# Patient Record
Sex: FEMALE | Race: WHITE | NOT HISPANIC OR LATINO | Employment: FULL TIME | URBAN - METROPOLITAN AREA
[De-identification: names, ages, dates, MRNs, and addresses within clinical notes are randomized per-mention and may not be internally consistent; named-entity substitution may affect disease eponyms.]

---

## 2020-01-28 ENCOUNTER — HOSPITAL ENCOUNTER (EMERGENCY)
Facility: HOSPITAL | Age: 20
Discharge: HOME | End: 2020-01-29
Admitting: EMERGENCY MEDICINE

## 2020-01-28 ENCOUNTER — APPOINTMENT (OUTPATIENT)
Dept: CT IMAGING | Facility: HOSPITAL | Age: 20
End: 2020-01-28

## 2020-01-28 VITALS
BODY MASS INDEX: 26.89 KG/M2 | DIASTOLIC BLOOD PRESSURE: 76 MMHG | WEIGHT: 133.38 LBS | HEART RATE: 74 BPM | HEIGHT: 59 IN | RESPIRATION RATE: 15 BRPM | TEMPERATURE: 98 F | OXYGEN SATURATION: 100 % | SYSTOLIC BLOOD PRESSURE: 138 MMHG

## 2020-01-28 DIAGNOSIS — R51.9 NONINTRACTABLE HEADACHE, UNSPECIFIED CHRONICITY PATTERN, UNSPECIFIED HEADACHE TYPE: Primary | ICD-10-CM

## 2020-01-28 DIAGNOSIS — S00.83XA CONTUSION OF FACE, INITIAL ENCOUNTER: ICD-10-CM

## 2020-01-28 PROCEDURE — 99282 EMERGENCY DEPT VISIT SF MDM: CPT

## 2020-01-28 PROCEDURE — 70450 CT HEAD/BRAIN W/O DYE: CPT

## 2021-01-22 ENCOUNTER — TELEPHONE (OUTPATIENT)
Dept: URGENT CARE | Facility: CLINIC | Age: 21
End: 2021-01-22

## 2021-01-22 PROCEDURE — U0003 INFECTIOUS AGENT DETECTION BY NUCLEIC ACID (DNA OR RNA); SEVERE ACUTE RESPIRATORY SYNDROME CORONAVIRUS 2 (SARS-COV-2) (CORONAVIRUS DISEASE [COVID-19]), AMPLIFIED PROBE TECHNIQUE, MAKING USE OF HIGH THROUGHPUT TECHNOLOGIES AS DESCRIBED BY CMS-2020-01-R: HCPCS | Performed by: FAMILY MEDICINE

## 2021-10-11 ENCOUNTER — TREATMENT (OUTPATIENT)
Dept: PHYSICAL THERAPY | Facility: CLINIC | Age: 21
End: 2021-10-11

## 2021-10-11 DIAGNOSIS — M79.605 LEFT LEG PAIN: Primary | ICD-10-CM

## 2021-10-11 PROCEDURE — 97110 THERAPEUTIC EXERCISES: CPT | Performed by: PHYSICAL THERAPIST

## 2021-10-11 PROCEDURE — 97162 PT EVAL MOD COMPLEX 30 MIN: CPT | Performed by: PHYSICAL THERAPIST

## 2021-10-11 NOTE — PROGRESS NOTES
Physical Therapy Initial Evaluation and Plan of Care    Patient: Katelyn Ferro   : 2000  Diagnosis/ICD-10 Code:  Left leg pain [M79.605]  Referring practitioner: SONG Perez  Date of Initial Visit: 10/11/2021  Today's Date: 10/11/2021  Patient seen for 1 sessions           Subjective Questionnaire: LEFS: 30%      Subjective Evaluation    History of Present Illness  Mechanism of injury: Patient presents to physical therapy with cc of left leg pain.  Reports that a few months ago she was helping move furniture at her work and reports hip pain started shortly after that.  Patient has cerebral palsy.  Reports that her symptoms of CP have been generally mild most of her life.  Patient has AFO on left ankle.  Reports that she sometimes uses a cane to assist with her walking.     Patient works in a  and is on her feet for long hours at a time.  Wishes to have less pain in her left hip with ADL's.     Pain  Current pain ratin  Quality: sharp and burning  Relieving factors: ice, rest and medications  Aggravating factors: ambulation, standing and squatting  Progression: no change    Diagnostic Tests  X-ray: normal    Patient Goals  Patient goals for therapy: decreased pain and increased strength             Objective          Palpation   Left   Tenderness of the iliopsoas, piriformis, rectus femoris and TFL.     Passive Range of Motion   Left Hip   Flexion: 90 degrees with pain  External rotation (90/90): 45 degrees with pain  Internal rotation (90/90): WFL    Right Hip   Flexion: 125 degrees with pain  External rotation (90/90): WFL  Internal rotation (90/90): WFL    Strength/Myotome Testing     Left Hip   Planes of Motion   Flexion: 4+  Abduction: 4  Adduction: 4    Right Hip   Planes of Motion   Flexion: 4+  Abduction: 4-  Adduction: 4-    Left Knee   Flexion: 4+  Extension: 4+    Right Knee   Flexion: 3-  Extension: 4+    Ambulation     Comments   Right knee valgus  Decreased stance  time on RLE          Assessment & Plan     Assessment  Impairments: abnormal gait, impaired physical strength, lacks appropriate home exercise program and pain with function  Assessment details: Patient presents to physical therapy with s/s of left leg pain.  Noted TTP of right hip flexor muscles, as well as in lateral right proximal thigh.  Noted limited active and passive ROM in right hip, limited by pain.  Patient would benefit from PT intervention in order to address these deficits so that she may return to ADL's and work activities with less pain and limitation.   Prognosis: good  Functional Limitations: uncomfortable because of pain  Goals  Plan Goals: In two weeks, patient will reports at least 25% reduction in pain level in left hip.   In two weeks, patient demonstrate at least 4+/5 muscular strength in LLE.    In four weeks, patient will demonstrate at least 25% improvement in PROM in left hip.   In four weeks, patient will demonstrate decreased perceived disability by increasing score on LEFS by at least 12%.   In four weeks, patient will demonstrate compliance with HEP.     Plan  Therapy options: will be seen for skilled physical therapy services  Planned modality interventions: cryotherapy and thermotherapy (hydrocollator packs)  Planned therapy interventions: manual therapy, soft tissue mobilization, strengthening, stretching, therapeutic activities, home exercise program, gait training, functional ROM exercises and abdominal trunk stabilization  Duration in visits: 20  Plan details: 1-3 times per week        Manual Therapy:         mins  19710;  Therapeutic Exercise:    15     mins  55817;     Neuromuscular Selina:        mins  84114;    Therapeutic Activity:          mins  21692;     Gait Training:           mins  93224;     Ultrasound:          mins  52504;    Electrical Stimulation:         mins  12433 ( );  Dry Needling          mins self-pay    Timed Treatment:   15   mins   Total Treatment:      45   mins    PT SIGNATURE: Gaudencio Ch, PT   DATE TREATMENT INITIATED: 10/11/2021    Initial Certification  Certification Period: 1/9/2022  I certify that the therapy services are furnished while this patient is under my care.  The services outlined above are required by this patient, and will be reviewed every 90 days.     PHYSICIAN: Mary Jo Singh, APRN      DATE:     Please sign and return via fax to [phone].. Thank you, [facility].

## 2022-11-21 ENCOUNTER — TRANSCRIBE ORDERS (OUTPATIENT)
Dept: PHYSICAL THERAPY | Facility: CLINIC | Age: 22
End: 2022-11-21

## 2022-11-21 DIAGNOSIS — Z86.69 HX OF CEREBRAL PALSY: ICD-10-CM

## 2022-11-21 DIAGNOSIS — M62.838 MUSCLE SPASM: Primary | ICD-10-CM

## 2022-12-06 ENCOUNTER — TREATMENT (OUTPATIENT)
Dept: PHYSICAL THERAPY | Facility: CLINIC | Age: 22
End: 2022-12-06

## 2022-12-06 DIAGNOSIS — M79.604 RIGHT LEG PAIN: Primary | ICD-10-CM

## 2022-12-06 PROCEDURE — 97162 PT EVAL MOD COMPLEX 30 MIN: CPT | Performed by: PHYSICAL THERAPIST

## 2022-12-06 PROCEDURE — 97110 THERAPEUTIC EXERCISES: CPT | Performed by: PHYSICAL THERAPIST

## 2022-12-06 NOTE — PROGRESS NOTES
Physical Therapy Initial Evaluation and Plan of Care      Patient: Katelyn Ferro   : 2000  Diagnosis/ICD-10 Code:  Right leg pain [M79.604]  Referring practitioner: SONG Perez  Date of Initial Visit: 2022  Today's Date: 2022  Patient seen for 1 sessions           Subjective Questionnaire: LEFS: 35%      Subjective Evaluation    History of Present Illness  Mechanism of injury: Patient presents to physical therapy with cc of right hip and leg pain.  Reports weakness and tightness in right leg musculature that has been making getting up off the floor difficult.  Patient has history of cerebral palsy.  Patient wears AFO on right ankle full time.  Patient works as a  and is up on her feet most the day while at school.  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  Wishes to have less pain/limitation with mobility due to right leg pain/weakness.     Pain  Current pain ratin  Quality: dull ache and sharp  Relieving factors: medications, heat and ice  Aggravating factors: ambulation, squatting and standing  Progression: worsening    Treatments  Previous treatment: medication  Patient Goals  Patient goals for therapy: decreased pain and increased strength             Objective          Palpation     Right   Hypertonic in the iliopsoas. Tenderness of the iliopsoas, piriformis and rectus femoris.     Passive Range of Motion     Right Hip   Flexion: 90 degrees with pain  External rotation (90/90): 50 degrees with pain  Internal rotation (90/90): 10 degrees with pain    Strength/Myotome Testing     Left Hip   Planes of Motion   Flexion: 5  Extension: 4+  Abduction: 4+  External rotation: 5  Internal rotation: 5    Right Hip   Planes of Motion   Flexion: 5  Extension: 4  Abduction: 4  External rotation: 4  Internal rotation: 4    Left Knee   Flexion: 5  Extension: 5    Right Knee   Flexion: 2+  Extension: 4+    Left Ankle/Foot    Dorsiflexion: 4+          Assessment & Plan     Assessment  Impairments: abnormal or restricted ROM, impaired physical strength, lacks appropriate home exercise program and pain with function  Functional Limitations: uncomfortable because of pain  Assessment details: Patient presents to physical therapy with s/s of right hip and leg pain.  Noted limited PROM in right hip by pain.  Noted tenderness and hypertonicity of right hip flexors.  Noted weakness in bilateral hips as well.  Patient is appropriate for PT intervention in order to address these deficits so that she may complete work and ADL activities with less pain/limitation.   Prognosis: good    Goals  Plan Goals: In two weeks, patient will report at least 25% reduction in pain level.    In two weeks, patient will demonstrate at least 25% improvement in PROM in right hip.     In four weeks, patient will demonstrate right hip AROM WFL without pain level over 2/10.  In four weeks, patient will demonstrate decreased perceived disability by increasing score on LEFS by at least 12%.  In four weeks, patient will report standing from kneeling position without pain while at school for at least 3 consecutive days.       Plan  Therapy options: will be seen for skilled therapy services  Planned modality interventions: cryotherapy, dry needling and thermotherapy (hydrocollator packs)  Planned therapy interventions: manual therapy, neuromuscular re-education, soft tissue mobilization, strengthening, stretching, therapeutic activities, home exercise program, gait training and abdominal trunk stabilization  Frequency: 1x week  Duration in weeks: 6        Manual Therapy:         mins  59077;  Therapeutic Exercise:    15     mins  73071;     Neuromuscular Selina:        mins  58947;    Therapeutic Activity:          mins  75390;     Gait Training:           mins  16273;     Ultrasound:          mins  80166;    Electrical Stimulation:         mins  00526 ( );  Dry  Needling          mins self-pay    Timed Treatment:   15   mins   Total Treatment:     40   mins    PT SIGNATURE: Gaudencio Ch, PT   DATE TREATMENT INITIATED: 12/6/2022    Initial Certification  Certification Period: 3/6/2023  I certify that the therapy services are furnished while this patient is under my care.  The services outlined above are required by this patient, and will be reviewed every 90 days.     PHYSICIAN: Mary Jo Singh, APRN      DATE:     Please sign and return via fax to [phone].. Thank you, [facility].

## 2022-12-15 ENCOUNTER — TREATMENT (OUTPATIENT)
Dept: PHYSICAL THERAPY | Facility: CLINIC | Age: 22
End: 2022-12-15

## 2022-12-15 DIAGNOSIS — M79.604 RIGHT LEG PAIN: Primary | ICD-10-CM

## 2022-12-15 PROCEDURE — 97140 MANUAL THERAPY 1/> REGIONS: CPT | Performed by: PHYSICAL THERAPIST

## 2022-12-15 PROCEDURE — 97110 THERAPEUTIC EXERCISES: CPT | Performed by: PHYSICAL THERAPIST

## 2022-12-15 NOTE — PROGRESS NOTES
Physical Therapy Daily Progress Note      Patient: Katelyn Ferro   : 2000  Diagnosis/ICD-10 Code:  Right leg pain [M79.604]  Referring practitioner: SONG Perez  Date of Initial Visit: Type: THERAPY  Noted: 2022  Today's Date: 12/15/2022  Patient seen for 2 sessions         Katelyn Ferro reports:  Right hip feeling a little better, however still sore at times.  Reports compliance with HEP.    Objective   See Exercise, Manual, and Modality Logs for complete treatment.       Assessment/Plan     Tolerates exercise and manual therapy well this visit.  Noted tenderness in rectus femoris, which reported to decreased after manual therapy.  Feeling well at end of visit.    Progress per Plan of Care           Manual Therapy:    10     mins  35996;  Therapeutic Exercise:    30     mins  68471;     Neuromuscular Selina:        mins  57351;    Therapeutic Activity:          mins  17321;     Gait Training:           mins  40808;     Ultrasound:          mins  74931;    Electrical Stimulation:         mins  93719 ( );  Dry Needling          mins self-pay    Timed Treatment:   40   mins   Total Treatment:     40   mins    Gaudencio Ch PT  Physical Therapist

## 2022-12-19 ENCOUNTER — TREATMENT (OUTPATIENT)
Dept: PHYSICAL THERAPY | Facility: CLINIC | Age: 22
End: 2022-12-19

## 2022-12-19 DIAGNOSIS — M79.604 RIGHT LEG PAIN: Primary | ICD-10-CM

## 2022-12-19 PROCEDURE — 97110 THERAPEUTIC EXERCISES: CPT | Performed by: PHYSICAL THERAPIST

## 2022-12-19 PROCEDURE — 97140 MANUAL THERAPY 1/> REGIONS: CPT | Performed by: PHYSICAL THERAPIST

## 2022-12-19 NOTE — PROGRESS NOTES
Physical Therapy Daily Progress Note    Patient: Katelyn Ferro   : 2000  Diagnosis/ICD-10 Code:  Right leg pain [M79.604]  Referring practitioner: SONG Perez  Date of Initial Visit: Type: THERAPY  Noted: 2022  Today's Date: 2022  Patient seen for 3 sessions         Katelyn Ferro reports:  Right hip feeling better.  Patient reports compliance with HEP.    Objective   See Exercise, Manual, and Modality Logs for complete treatment.       Assessment/Plan     Noted limited hip ER on right side prior to treatment.  Tolerates manual therapy well.  Progressing well towards goals.     Progress per Plan of Care           Manual Therapy:    10     mins  92142;  Therapeutic Exercise:    30     mins  67412;     Neuromuscular Selina:        mins  52903;    Therapeutic Activity:          mins  60022;     Gait Training:           mins  79593;     Ultrasound:          mins  34728;    Electrical Stimulation:         mins  71119 ( );  Dry Needling          mins self-pay    Timed Treatment:   40   mins   Total Treatment:     40   mins    Gaudencio Ch PT  Physical Therapist

## 2022-12-27 ENCOUNTER — TREATMENT (OUTPATIENT)
Dept: PHYSICAL THERAPY | Facility: CLINIC | Age: 22
End: 2022-12-27

## 2022-12-27 DIAGNOSIS — M79.604 RIGHT LEG PAIN: Primary | ICD-10-CM

## 2022-12-27 PROCEDURE — 97110 THERAPEUTIC EXERCISES: CPT | Performed by: PHYSICAL THERAPIST

## 2022-12-27 PROCEDURE — 97140 MANUAL THERAPY 1/> REGIONS: CPT | Performed by: PHYSICAL THERAPIST

## 2022-12-27 NOTE — PROGRESS NOTES
Physical Therapy Daily Progress Note    Patient: Katelyn Ferro   : 2000  Diagnosis/ICD-10 Code:  Right leg pain [M79.604]  Referring practitioner: SONG Perez  Date of Initial Visit: Type: THERAPY  Noted: 2022  Today's Date: 2022  Patient seen for 4 sessions         Katelyn Ferro reports:  Right hip still has mild pain at times, however occurring less frequently.  Patient reports that she has been less active lately due to being off from work.    Objective   See Exercise, Manual, and Modality Logs for complete treatment.       Assessment/Plan     Tolerates today's treatment well.  Noted decreased TTP of right hip this visit.  Progressing well towards goals.    Progress per Plan of Care           Manual Therapy:    15     mins  78787;  Therapeutic Exercise:    15     mins  41406;     Neuromuscular Selina:        mins  30406;    Therapeutic Activity:          mins  84223;     Gait Training:           mins  63515;     Ultrasound:          mins  95893;    Electrical Stimulation:         mins  41729 ( );  Dry Needling          mins self-pay    Timed Treatment:   30   mins   Total Treatment:     30   mins    Gaudencio Ch PT  Physical Therapist

## 2023-01-03 ENCOUNTER — TREATMENT (OUTPATIENT)
Dept: PHYSICAL THERAPY | Facility: CLINIC | Age: 23
End: 2023-01-03
Payer: COMMERCIAL

## 2023-01-03 DIAGNOSIS — M79.604 RIGHT LEG PAIN: Primary | ICD-10-CM

## 2023-01-03 PROCEDURE — 97110 THERAPEUTIC EXERCISES: CPT | Performed by: PHYSICAL THERAPIST

## 2023-01-03 PROCEDURE — 97140 MANUAL THERAPY 1/> REGIONS: CPT | Performed by: PHYSICAL THERAPIST

## 2023-01-03 NOTE — PROGRESS NOTES
Physical Therapy Daily Progress Note    Patient: Katelyn Ferro   : 2000  Diagnosis/ICD-10 Code:  Right leg pain [M79.604]  Referring practitioner: SONG Perez  Date of Initial Visit: Type: THERAPY  Noted: 2022  Today's Date: 1/3/2023  Patient seen for 5 sessions         Katelyn Ferro reports:  Right hip feeling well.  Going back to work today.  Reports compliance with HEP.    Objective   See Exercise, Manual, and Modality Logs for complete treatment.       Assessment/Plan     Mild soreness in right anterior hip with contract/relax iliopsoas and rectus femoris stretching.  Feeling well at end of visit.    Progress per Plan of Care           Manual Therapy:    25     mins  68208;  Therapeutic Exercise:    15     mins  62646;     Neuromuscular Selina:        mins  32115;    Therapeutic Activity:          mins  66312;     Gait Training:           mins  53453;     Ultrasound:          mins  78151;    Electrical Stimulation:         mins  38898 ( );  Dry Needling          mins self-pay    Timed Treatment:   40   mins   Total Treatment:     40   mins    Gaudencio Ch PT  Physical Therapist

## 2023-01-10 ENCOUNTER — TREATMENT (OUTPATIENT)
Dept: PHYSICAL THERAPY | Facility: CLINIC | Age: 23
End: 2023-01-10
Payer: COMMERCIAL

## 2023-01-10 DIAGNOSIS — M79.604 RIGHT LEG PAIN: Primary | ICD-10-CM

## 2023-01-10 PROCEDURE — 97140 MANUAL THERAPY 1/> REGIONS: CPT | Performed by: PHYSICAL THERAPIST

## 2023-01-10 PROCEDURE — 97110 THERAPEUTIC EXERCISES: CPT | Performed by: PHYSICAL THERAPIST

## 2023-01-10 NOTE — PROGRESS NOTES
Physical Therapy Daily Progress Note    Patient: Katelyn Ferro   : 2000  Diagnosis/ICD-10 Code:  Right leg pain [M79.604]  Referring practitioner: SONG Perez  Date of Initial Visit: Type: THERAPY  Noted: 2022  Today's Date: 1/10/2023  Patient seen for 6 sessions         Katelyn Ferro reports:  Right hip was a little sore after returning to work last week.  Mild soreness in anterior hip today.     Objective   See Exercise, Manual, and Modality Logs for complete treatment.       Assessment/Plan     Tolerates treatment well this visit.  Requires manual cueing for proper technique with clamshell exercise.  Reviewed HEP with proper technique.     Progress per Plan of Care           Manual Therapy:    25     mins  84912;  Therapeutic Exercise:    18     mins  65938;     Neuromuscular Selina:        mins  92289;    Therapeutic Activity:          mins  66142;     Gait Training:           mins  91854;     Ultrasound:          mins  20123;    Electrical Stimulation:         mins  05885 ( );  Dry Needling          mins self-pay    Timed Treatment:   43   mins   Total Treatment:     43   mins    Gaudencio Ch PT  Physical Therapist

## 2023-01-20 ENCOUNTER — TREATMENT (OUTPATIENT)
Dept: PHYSICAL THERAPY | Facility: CLINIC | Age: 23
End: 2023-01-20
Payer: COMMERCIAL

## 2023-01-20 DIAGNOSIS — M79.604 RIGHT LEG PAIN: Primary | ICD-10-CM

## 2023-01-20 PROCEDURE — 97110 THERAPEUTIC EXERCISES: CPT | Performed by: PHYSICAL THERAPIST

## 2023-01-20 PROCEDURE — 97140 MANUAL THERAPY 1/> REGIONS: CPT | Performed by: PHYSICAL THERAPIST

## 2023-01-20 NOTE — PROGRESS NOTES
Physical Therapy Daily Progress Note      Patient: Katelyn Ferro   : 2000  Diagnosis/ICD-10 Code:  Right leg pain [M79.604]  Referring practitioner: SONG Perez  Date of Initial Visit: Type: THERAPY  Noted: 2022  Today's Date: 2023  Patient seen for 7 sessions             Subjective Pt reports right hip is sore today which she says is typical by the end of the work week.    Objective   See Exercise, Manual, and Modality Logs for complete treatment.       Assessment/Plan  Good tolerance with exercises.  Pt responds very well to manual stretching, stating she always feels better after PT session because she is not able to stretch her hip as well as therapy does.    Progress per Plan of Care           Timed:         Manual Therapy:   25     mins  50991;     Therapeutic Exercise:    18     mins  61515;         Timed Treatment:   43   mins   Total Treatment:     43   mins        Jesse Jaquez PTA  Physical Therapist Assistant

## 2023-01-24 ENCOUNTER — TREATMENT (OUTPATIENT)
Dept: PHYSICAL THERAPY | Facility: CLINIC | Age: 23
End: 2023-01-24
Payer: COMMERCIAL

## 2023-01-24 DIAGNOSIS — M79.604 RIGHT LEG PAIN: Primary | ICD-10-CM

## 2023-01-24 PROCEDURE — 97140 MANUAL THERAPY 1/> REGIONS: CPT | Performed by: PHYSICAL THERAPIST

## 2023-01-24 PROCEDURE — 97110 THERAPEUTIC EXERCISES: CPT | Performed by: PHYSICAL THERAPIST

## 2023-01-24 NOTE — PROGRESS NOTES
Physical Therapy Daily Progress Note    Patient: Katelyn Ferro   : 2000  Diagnosis/ICD-10 Code:  Right leg pain [M79.604]  Referring practitioner: SONG Perez  Date of Initial Visit: Type: THERAPY  Noted: 2022  Today's Date: 2023  Patient seen for 8 sessions         Katelyn Ferro reports:  Right hip is a little sore after work activities, however feeling better since starting PT.     Objective   See Exercise, Manual, and Modality Logs for complete treatment.       Assessment/Plan     Noted tenderness to palpation of proximal rectus femoris.  Tolerates treatment well.  Requires 1-2 verbal cues for proper exercise technique.    Progress per Plan of Care           Manual Therapy:    15     mins  13146;  Therapeutic Exercise:    25     mins  74255;     Neuromuscular Selina:        mins  12293;    Therapeutic Activity:          mins  19442;     Gait Training:          mins  96291;     Ultrasound:          mins  56350;    Electrical Stimulation:         mins  57172 ( );  Dry Needling          mins self-pay    Timed Treatment:   40   mins   Total Treatment:     40   mins    Gaudencio Ch PT  Physical Therapist

## 2023-02-14 ENCOUNTER — TREATMENT (OUTPATIENT)
Dept: PHYSICAL THERAPY | Facility: CLINIC | Age: 23
End: 2023-02-14
Payer: COMMERCIAL

## 2023-02-14 DIAGNOSIS — M79.604 RIGHT LEG PAIN: Primary | ICD-10-CM

## 2023-02-14 PROCEDURE — 97140 MANUAL THERAPY 1/> REGIONS: CPT | Performed by: PHYSICAL THERAPIST

## 2023-02-14 PROCEDURE — 97112 NEUROMUSCULAR REEDUCATION: CPT | Performed by: PHYSICAL THERAPIST

## 2023-02-14 PROCEDURE — 97110 THERAPEUTIC EXERCISES: CPT | Performed by: PHYSICAL THERAPIST

## 2023-02-14 NOTE — PROGRESS NOTES
Physical Therapy Daily Progress Note    Patient: Katelyn Ferro   : 2000  Diagnosis/ICD-10 Code:  Right leg pain [M79.604]  Referring practitioner: SONG Perez  Date of Initial Visit: Type: THERAPY  Noted: 2022  Today's Date: 2023  Patient seen for 9 sessions         Katelyn Ferro reports:  Right hip feeling better.  Still having mild discomfort with standing at work.  Reports compliance with HEP.    Objective   See Exercise, Manual, and Modality Logs for complete treatment.       Assessment/Plan    Right hip IR/ER improved this visit, after manual therapy.  Patient feeling well after stretching.  Progressing well.     Progress per Plan of Care           Manual Therapy:    10    mins  34836;  Therapeutic Exercise:    10     mins  73870;     Neuromuscular Selina:   8     mins  00471;    Therapeutic Activity:          mins  20951;     Gait Training:           mins  61651;     Ultrasound:          mins  02747;    Electrical Stimulation:         mins  63149 (MC );  Dry Needling          mins self-pay    Timed Treatment:   28   mins   Total Treatment:     28   mins    Gaudencio Ch PT  Physical Therapist

## 2023-02-28 ENCOUNTER — TREATMENT (OUTPATIENT)
Dept: PHYSICAL THERAPY | Facility: CLINIC | Age: 23
End: 2023-02-28
Payer: COMMERCIAL

## 2023-02-28 DIAGNOSIS — M79.604 RIGHT LEG PAIN: Primary | ICD-10-CM

## 2023-02-28 PROCEDURE — 97112 NEUROMUSCULAR REEDUCATION: CPT | Performed by: PHYSICAL THERAPIST

## 2023-02-28 PROCEDURE — 97110 THERAPEUTIC EXERCISES: CPT | Performed by: PHYSICAL THERAPIST

## 2023-02-28 PROCEDURE — 97140 MANUAL THERAPY 1/> REGIONS: CPT | Performed by: PHYSICAL THERAPIST

## 2023-02-28 NOTE — PROGRESS NOTES
Physical Therapy Daily Progress Note      Patient: Katelyn Ferro   : 2000  Diagnosis/ICD-10 Code:  Right leg pain [M79.604]  Referring practitioner: SONG Perez  Date of Initial Visit: Type: THERAPY  Noted: 2022  Today's Date: 2023  Patient seen for 10 sessions         Katelyn Ferro reports:  Right hip has been tight during and after work this past week.  Reports compliance with home exercise program.    Objective   See Exercise, Manual, and Modality Logs for complete treatment.       Assessment/Plan     Limited hip IR (R) noted at beginning of treatment, which improved after joint mobilization.  Continued tightness of right hip flexor noted.  Feeling well at end of visit.      Progress per Plan of Care           Manual Therapy:    15     mins  51519;  Therapeutic Exercise:    10     mins  26068;     Neuromuscular Selina:    10    mins  70004;    Therapeutic Activity:          mins  15509;     Gait Training:           mins  82862;     Ultrasound:          mins  55119;    Electrical Stimulation:         mins  87403 ( );  Dry Needling          mins self-pay    Timed Treatment:   35   mins   Total Treatment:     35   mins    Gaudencio Ch PT  Physical Therapist

## 2023-03-07 ENCOUNTER — TREATMENT (OUTPATIENT)
Dept: PHYSICAL THERAPY | Facility: CLINIC | Age: 23
End: 2023-03-07
Payer: COMMERCIAL

## 2023-03-07 DIAGNOSIS — M79.604 RIGHT LEG PAIN: Primary | ICD-10-CM

## 2023-03-07 PROCEDURE — 97112 NEUROMUSCULAR REEDUCATION: CPT | Performed by: PHYSICAL THERAPIST

## 2023-03-07 PROCEDURE — 97110 THERAPEUTIC EXERCISES: CPT | Performed by: PHYSICAL THERAPIST

## 2023-03-07 PROCEDURE — 97140 MANUAL THERAPY 1/> REGIONS: CPT | Performed by: PHYSICAL THERAPIST

## 2023-03-07 NOTE — PROGRESS NOTES
Physical Therapy Daily Progress Note      Patient: Katelyn Ferro   : 2000  Diagnosis/ICD-10 Code:  Right leg pain [M79.604]  Referring practitioner: SONG Perez  Date of Initial Visit: Type: THERAPY  Noted: 2022  Today's Date: 3/7/2023  Patient seen for 11 sessions         Katelyn Ferro reports:  Some soreness in right anterior hip after work over the past few days.  Reports compliance with HEP and less pain after stretching at home.     Objective   See Exercise, Manual, and Modality Logs for complete treatment.       Assessment/Plan     Tolerates joint mobilization and stretching to right hip well this visit.  Fatigue in right glute medius at end of visit.  Progressing well towards goals.     Progress per Plan of Care           Manual Therapy:    15     mins  60002;  Therapeutic Exercise:    10     mins  59351;     Neuromuscular Selina:    10    mins  82478;    Therapeutic Activity:          mins  47968;     Gait Training:           mins  90411;     Ultrasound:          mins  10037;    Electrical Stimulation:         mins  18802 ( );  Dry Needling          mins self-pay    Timed Treatment:   35   mins   Total Treatment:     35   mins    Gaudencio Ch PT  Physical Therapist

## 2023-03-21 ENCOUNTER — TREATMENT (OUTPATIENT)
Dept: PHYSICAL THERAPY | Facility: CLINIC | Age: 23
End: 2023-03-21
Payer: COMMERCIAL

## 2023-03-21 DIAGNOSIS — M79.604 RIGHT LEG PAIN: Primary | ICD-10-CM

## 2023-03-21 PROCEDURE — 97110 THERAPEUTIC EXERCISES: CPT | Performed by: PHYSICAL THERAPIST

## 2023-03-21 PROCEDURE — 97140 MANUAL THERAPY 1/> REGIONS: CPT | Performed by: PHYSICAL THERAPIST

## 2023-03-21 NOTE — PROGRESS NOTES
Physical Therapy Daily Progress Note    Patient: Katelyn Ferro   : 2000  Diagnosis/ICD-10 Code:  Right leg pain [M79.604]  Referring practitioner: SONG Perez  Date of Initial Visit: Type: THERAPY  Noted: 2022  Today's Date: 3/21/2023  Patient seen for 12 sessions         Katelyn Ferro reports:  Right leg feeling better overall.  Had mild soreness in right posterior hip after work yesterday.  Compliant with HEP.    Objective   See Exercise, Manual, and Modality Logs for complete treatment.       Assessment/Plan     Feeling well after manual therapy.  Patient compliant with HEP.  Patient appropriate to continue with I HEP due to plateau in progress.    Progress per Plan of Care           Manual Therapy:    15     mins  59974;  Therapeutic Exercise:    12     mins  85369;     Neuromuscular Selina:        mins  17639;    Therapeutic Activity:          mins  42873;     Gait Training:           mins  38643;     Ultrasound:          mins  82791;    Electrical Stimulation:         mins  90652 ( );  Dry Needling          mins self-pay    Timed Treatment:   27   mins   Total Treatment:     27   mins    Gaudencio Ch PT  Physical Therapist

## 2023-05-26 ENCOUNTER — APPOINTMENT (OUTPATIENT)
Dept: GENERAL RADIOLOGY | Facility: HOSPITAL | Age: 23
End: 2023-05-26
Payer: COMMERCIAL

## 2023-05-26 ENCOUNTER — HOSPITAL ENCOUNTER (EMERGENCY)
Facility: HOSPITAL | Age: 23
Discharge: HOME | End: 2023-05-26
Attending: EMERGENCY MEDICINE
Payer: COMMERCIAL

## 2023-05-26 VITALS
BODY MASS INDEX: 27.78 KG/M2 | SYSTOLIC BLOOD PRESSURE: 108 MMHG | DIASTOLIC BLOOD PRESSURE: 75 MMHG | HEART RATE: 78 BPM | RESPIRATION RATE: 18 BRPM | HEIGHT: 59 IN | OXYGEN SATURATION: 100 % | TEMPERATURE: 98.5 F | WEIGHT: 137.79 LBS

## 2023-05-26 DIAGNOSIS — R42 LIGHTHEADED: Primary | ICD-10-CM

## 2023-05-26 LAB
ALBUMIN SERPL-MCNC: 3.8 G/DL (ref 3.5–5.2)
ALBUMIN/GLOB SERPL: 1.7 G/DL
ALP SERPL-CCNC: 34 U/L (ref 39–117)
ALT SERPL W P-5'-P-CCNC: 20 U/L (ref 1–33)
ANION GAP SERPL CALCULATED.3IONS-SCNC: 13 MMOL/L (ref 5–15)
AST SERPL-CCNC: 16 U/L (ref 1–32)
B-HCG UR QL: NEGATIVE
BASOPHILS # BLD AUTO: 0.1 10*3/MM3 (ref 0–0.2)
BASOPHILS NFR BLD AUTO: 1 % (ref 0–1.5)
BILIRUB SERPL-MCNC: 0.3 MG/DL (ref 0–1.2)
BILIRUB UR QL STRIP: NEGATIVE
BUN SERPL-MCNC: 16 MG/DL (ref 6–20)
BUN/CREAT SERPL: 20.3 (ref 7–25)
CALCIUM SPEC-SCNC: 8.6 MG/DL (ref 8.6–10.5)
CHLORIDE SERPL-SCNC: 107 MMOL/L (ref 98–107)
CLARITY UR: CLEAR
CO2 SERPL-SCNC: 20 MMOL/L (ref 22–29)
COLOR UR: YELLOW
CREAT SERPL-MCNC: 0.79 MG/DL (ref 0.57–1)
DEPRECATED RDW RBC AUTO: 48.1 FL (ref 37–54)
EGFRCR SERPLBLD CKD-EPI 2021: 107.3 ML/MIN/1.73
EOSINOPHIL # BLD AUTO: 0.1 10*3/MM3 (ref 0–0.4)
EOSINOPHIL NFR BLD AUTO: 1.1 % (ref 0.3–6.2)
ERYTHROCYTE [DISTWIDTH] IN BLOOD BY AUTOMATED COUNT: 14.2 % (ref 12.3–15.4)
GLOBULIN UR ELPH-MCNC: 2.2 GM/DL
GLUCOSE SERPL-MCNC: 81 MG/DL (ref 65–99)
GLUCOSE UR STRIP-MCNC: NEGATIVE MG/DL
HCT VFR BLD AUTO: 43.1 % (ref 34–46.6)
HGB BLD-MCNC: 14 G/DL (ref 12–15.9)
HGB UR QL STRIP.AUTO: NEGATIVE
KETONES UR QL STRIP: ABNORMAL
LEUKOCYTE ESTERASE UR QL STRIP.AUTO: NEGATIVE
LYMPHOCYTES # BLD AUTO: 4.9 10*3/MM3 (ref 0.7–3.1)
LYMPHOCYTES NFR BLD AUTO: 36.6 % (ref 19.6–45.3)
MAGNESIUM SERPL-MCNC: 2.2 MG/DL (ref 1.6–2.6)
MCH RBC QN AUTO: 31.6 PG (ref 26.6–33)
MCHC RBC AUTO-ENTMCNC: 32.4 G/DL (ref 31.5–35.7)
MCV RBC AUTO: 97.4 FL (ref 79–97)
MONOCYTES # BLD AUTO: 0.9 10*3/MM3 (ref 0.1–0.9)
MONOCYTES NFR BLD AUTO: 7.1 % (ref 5–12)
NEUTROPHILS NFR BLD AUTO: 54.2 % (ref 42.7–76)
NEUTROPHILS NFR BLD AUTO: 7.2 10*3/MM3 (ref 1.7–7)
NITRITE UR QL STRIP: NEGATIVE
NRBC BLD AUTO-RTO: 0 /100 WBC (ref 0–0.2)
PH UR STRIP.AUTO: 6 [PH] (ref 5–8)
PLATELET # BLD AUTO: 344 10*3/MM3 (ref 140–450)
PMV BLD AUTO: 9.6 FL (ref 6–12)
POTASSIUM SERPL-SCNC: 3.8 MMOL/L (ref 3.5–5.2)
PROT SERPL-MCNC: 6 G/DL (ref 6–8.5)
PROT UR QL STRIP: NEGATIVE
QT INTERVAL: 383 MS
RBC # BLD AUTO: 4.42 10*6/MM3 (ref 3.77–5.28)
SARS-COV-2 RNA RESP QL NAA+PROBE: NOT DETECTED
SODIUM SERPL-SCNC: 140 MMOL/L (ref 136–145)
SP GR UR STRIP: 1.02 (ref 1–1.03)
T4 FREE SERPL-MCNC: 1.49 NG/DL (ref 0.93–1.7)
TSH SERPL DL<=0.05 MIU/L-ACNC: 8.01 UIU/ML (ref 0.27–4.2)
UROBILINOGEN UR QL STRIP: ABNORMAL
WBC NRBC COR # BLD: 13.4 10*3/MM3 (ref 3.4–10.8)

## 2023-05-26 PROCEDURE — 87635 SARS-COV-2 COVID-19 AMP PRB: CPT | Performed by: NURSE PRACTITIONER

## 2023-05-26 PROCEDURE — 99284 EMERGENCY DEPT VISIT MOD MDM: CPT

## 2023-05-26 PROCEDURE — 83735 ASSAY OF MAGNESIUM: CPT | Performed by: NURSE PRACTITIONER

## 2023-05-26 PROCEDURE — 93005 ELECTROCARDIOGRAM TRACING: CPT | Performed by: NURSE PRACTITIONER

## 2023-05-26 PROCEDURE — 84439 ASSAY OF FREE THYROXINE: CPT | Performed by: NURSE PRACTITIONER

## 2023-05-26 PROCEDURE — 71045 X-RAY EXAM CHEST 1 VIEW: CPT

## 2023-05-26 PROCEDURE — 80050 GENERAL HEALTH PANEL: CPT | Performed by: NURSE PRACTITIONER

## 2023-05-26 PROCEDURE — 81025 URINE PREGNANCY TEST: CPT | Performed by: NURSE PRACTITIONER

## 2023-05-26 PROCEDURE — 81003 URINALYSIS AUTO W/O SCOPE: CPT | Performed by: NURSE PRACTITIONER

## 2023-05-26 RX ORDER — SODIUM CHLORIDE 0.9 % (FLUSH) 0.9 %
10 SYRINGE (ML) INJECTION AS NEEDED
Status: DISCONTINUED | OUTPATIENT
Start: 2023-05-26 | End: 2023-05-26 | Stop reason: HOSPADM

## 2023-05-26 NOTE — ED NOTES
Orthostatic Vitals     Lying down - 103/68 HR 81    Sitting - 122/79 HR 87    Standing - 131/83 HR 74. Pt reports slight dizziness when standing up.

## 2023-05-26 NOTE — ED NOTES
Patient was at work today and had what she things was an asthma attack. Patients mother stated that her work place called her and reported that the patient was turning different colors and was jumbling her words and not making sense. Patient does  not really remember the incident. Patient is now alert and oriented.

## 2023-05-26 NOTE — ED PROVIDER NOTES
Subjective   History of Present Illness  Patient is a 22-year-old white female with history of cerebral palsy and asthma who presents today with complaints of what she describes as an asthma attack.  She states she had 1 earlier in the week.  She states she was at work today at moving around at  when she suddenly started to not feel well.  Her coworkers told her that she had lost color in her face.  She states she became nauseous and lightheaded.  She did not pass out.  She states EMS was called and she was evaluated.  She was given puffs on an inhaler and by the time she got  to the ED she is feeling better.  She denies any complaints at this time.  She denies any vomiting diarrhea chest pain shortness of breath or abdominal pain.  She denies any dysuria.  She does state that she only ate a very small breakfast this morning and has not eaten lunch today.        Review of Systems   Constitutional: Negative for fever.   HENT: Negative for congestion.    Eyes: Negative for visual disturbance.   Respiratory: Negative for cough and shortness of breath.    Cardiovascular: Negative for chest pain.   Gastrointestinal: Positive for nausea. Negative for abdominal pain, diarrhea and vomiting.   Musculoskeletal: Negative for neck pain and neck stiffness.   Skin: Negative for rash.   Neurological: Positive for dizziness, weakness and light-headedness. Negative for syncope, speech difficulty, numbness and headaches.       Past Medical History:   Diagnosis Date   • Asthma    • Lung disease    • Vocal cord dysfunction        No Known Allergies    Past Surgical History:   Procedure Laterality Date   • EAR TUBES         History reviewed. No pertinent family history.    Social History     Socioeconomic History   • Marital status: Single   Tobacco Use   • Smoking status: Never   • Smokeless tobacco: Never   Substance and Sexual Activity   • Alcohol use: Never   • Drug use: Never           Objective   Physical Exam  Vital signs  and triage nurse note reviewed.  Constitutional: Awake, alert; well-developed and well-nourished. No acute distress is noted.  HEENT: Normocephalic, atraumatic; pupils are PERRL with intact EOM; oropharynx is pink and moist without exudate or erythema.  No drooling or pooling of oral secretions.  Neck: Supple, full range of motion without pain; no cervical lymphadenopathy. Normal phonation.  Cardiovascular: Regular rate and rhythm, normal S1-S2.  No murmur noted.  Pulmonary: Respiratory effort regular nonlabored, breath sounds clear to auscultation all fields.  Abdomen: Soft, nontender, nondistended with normoactive bowel sounds; no rebound or guarding.  Musculoskeletal: Independent range of motion of all extremities with no palpable tenderness or edema.  Neuro: Alert oriented x3, speech is clear and appropriate, GCS 15.    Skin: Flesh tone, warm, dry, intact; no erythematous or petechial rash or lesion.      Procedures           ED Course      Labs Reviewed   COMPREHENSIVE METABOLIC PANEL - Abnormal; Notable for the following components:       Result Value    CO2 20.0 (*)     Alkaline Phosphatase 34 (*)     All other components within normal limits    Narrative:     GFR Normal >60  Chronic Kidney Disease <60  Kidney Failure <15     URINALYSIS W/ MICROSCOPIC IF INDICATED (NO CULTURE) - Abnormal; Notable for the following components:    Ketones, UA Trace (*)     All other components within normal limits    Narrative:     Urine microscopic not indicated.   TSH - Abnormal; Notable for the following components:    TSH 8.010 (*)     All other components within normal limits   CBC WITH AUTO DIFFERENTIAL - Abnormal; Notable for the following components:    WBC 13.40 (*)     MCV 97.4 (*)     Neutrophils, Absolute 7.20 (*)     Lymphocytes, Absolute 4.90 (*)     All other components within normal limits   COVID-19,CEPHEID/SHANDA,COR/JARROD/PAD/ARTEMIO/MAD IN-HOUSE,NP SWAB IN TRANSPORT MEDIA 3-4 HR TAT, RT-PCR - Normal    Narrative:      Fact sheet for providers: https://www.fda.gov/media/764224/download     Fact sheet for patients: https://www.fda.gov/media/462399/download  Fact sheet for providers: https://www.fda.gov/media/859112/download    Fact sheet for patients: https://www.fda.gov/media/866619/download    Test performed by PCR.   PREGNANCY, URINE - Normal   MAGNESIUM - Normal   T4, FREE - Normal    Narrative:     Results may be falsely increased if patient taking Biotin.     CBC AND DIFFERENTIAL    Narrative:     The following orders were created for panel order CBC & Differential.  Procedure                               Abnormality         Status                     ---------                               -----------         ------                     CBC Auto Differential[[id number]]        Abnormal            Final result                 Please view results for these tests on the individual orders.     XR Chest 1 View    Result Date: 5/26/2023  Impression: No active disease Electronically Signed: Abel Pittman  5/26/2023 3:04 PM EDT  Workstation ID: [id number]    Medications   sodium chloride 0.9 % flush 10 mL (has no administration in time range)                                          Medical Decision Making  Patient presents today with complaints of an episode of feeling lightheaded and nauseous while at work today.  No other symptoms.  Felt better after inhaler puffs.    Patient had above exam and evaluation.  She is placed on continuous cardiac monitor.  IV was established.  Labs EKG and chest x-ray were obtained.    Workup: CBC significant for WBC of 13.4, no bands.  Metabolic panel is unremarkable.  Magnesium within normal limits.  COVID-negative.  TSH 8.01, free T41.49.  Urine hCG is negative.  Urinalysis shows no evidence of infection.  Chest x-ray interpretation shows no acute abnormality, this was corroborated by the radiologist.  My EKG interpretation shows sinus rhythm with ventricular rate of 70, no acute ST or T wave  changes, no ectopy, this is corroborated by Dr. Cheek.      Amount and/or Complexity of Data Reviewed  Labs: ordered.  Radiology: ordered.  ECG/medicine tests: ordered.      Risk  Prescription drug management.          Final diagnoses:   Lightheaded       ED Disposition  ED Disposition     ED Disposition   Discharge    Condition   Stable    Comment   --             Mary Jo Singh, APRN  [address] [facility] , [place] [address]  [phone]    Schedule an appointment as soon as possible for a visit            Medication List      No changes were made to your prescriptions during this visit.          Christina Honeycutt, APRN  05/26/23 [id number]

## 2023-05-26 NOTE — DISCHARGE INSTRUCTIONS
Continue current home medications.  Drink plenty of fluids.  Eat regular meals, don't skip meals.  Follow-up with your primary care provider.  Return for new or worsening symptoms.

## 2025-03-14 PROCEDURE — 87086 URINE CULTURE/COLONY COUNT: CPT | Performed by: FAMILY MEDICINE

## 2025-03-14 PROCEDURE — 87186 SC STD MICRODIL/AGAR DIL: CPT | Performed by: FAMILY MEDICINE

## 2025-03-14 PROCEDURE — 87077 CULTURE AEROBIC IDENTIFY: CPT | Performed by: FAMILY MEDICINE

## 2025-03-16 ENCOUNTER — APPOINTMENT (OUTPATIENT)
Dept: CT IMAGING | Facility: HOSPITAL | Age: 25
End: 2025-03-16
Payer: COMMERCIAL

## 2025-03-16 ENCOUNTER — RESULTS FOLLOW-UP (OUTPATIENT)
Dept: URGENT CARE | Facility: CLINIC | Age: 25
End: 2025-03-16
Payer: COMMERCIAL

## 2025-03-16 ENCOUNTER — APPOINTMENT (OUTPATIENT)
Dept: ULTRASOUND IMAGING | Facility: HOSPITAL | Age: 25
End: 2025-03-16
Payer: COMMERCIAL

## 2025-03-16 ENCOUNTER — HOSPITAL ENCOUNTER (EMERGENCY)
Facility: HOSPITAL | Age: 25
Discharge: HOME | End: 2025-03-16
Admitting: EMERGENCY MEDICINE
Payer: COMMERCIAL

## 2025-03-16 VITALS
RESPIRATION RATE: 20 BRPM | SYSTOLIC BLOOD PRESSURE: 121 MMHG | WEIGHT: 156.31 LBS | TEMPERATURE: 99 F | HEART RATE: 91 BPM | HEIGHT: 59 IN | OXYGEN SATURATION: 98 % | BODY MASS INDEX: 31.51 KG/M2 | DIASTOLIC BLOOD PRESSURE: 70 MMHG

## 2025-03-16 DIAGNOSIS — R10.32 LEFT LOWER QUADRANT ABDOMINAL PAIN: Primary | ICD-10-CM

## 2025-03-16 DIAGNOSIS — R11.2 NAUSEA AND VOMITING, UNSPECIFIED VOMITING TYPE: ICD-10-CM

## 2025-03-16 DIAGNOSIS — R30.0 DYSURIA: ICD-10-CM

## 2025-03-16 LAB
ALBUMIN SERPL-MCNC: 4.1 G/DL (ref 3.5–5.2)
ALBUMIN/GLOB SERPL: 1.5 G/DL
ALP SERPL-CCNC: 48 U/L (ref 39–117)
ALT SERPL W P-5'-P-CCNC: 18 U/L (ref 1–33)
ANION GAP SERPL CALCULATED.3IONS-SCNC: 10.6 MMOL/L (ref 5–15)
AST SERPL-CCNC: 26 U/L (ref 1–32)
B-HCG UR QL: NEGATIVE
BASOPHILS # BLD AUTO: 0.1 10*3/MM3 (ref 0–0.2)
BASOPHILS NFR BLD AUTO: 0.9 % (ref 0–1.5)
BILIRUB SERPL-MCNC: 0.2 MG/DL (ref 0–1.2)
BILIRUB UR QL STRIP: NEGATIVE
BUN SERPL-MCNC: 11 MG/DL (ref 6–20)
BUN/CREAT SERPL: 14.5 (ref 7–25)
CALCIUM SPEC-SCNC: 9.6 MG/DL (ref 8.6–10.5)
CHLORIDE SERPL-SCNC: 104 MMOL/L (ref 98–107)
CLARITY UR: CLEAR
CO2 SERPL-SCNC: 22.4 MMOL/L (ref 22–29)
COLOR UR: YELLOW
CREAT SERPL-MCNC: 0.76 MG/DL (ref 0.57–1)
DEPRECATED RDW RBC AUTO: 45 FL (ref 37–54)
EGFRCR SERPLBLD CKD-EPI 2021: 111 ML/MIN/1.73
EOSINOPHIL # BLD AUTO: 0.14 10*3/MM3 (ref 0–0.4)
EOSINOPHIL NFR BLD AUTO: 1.2 % (ref 0.3–6.2)
ERYTHROCYTE [DISTWIDTH] IN BLOOD BY AUTOMATED COUNT: 13.2 % (ref 12.3–15.4)
GLOBULIN UR ELPH-MCNC: 2.8 GM/DL
GLUCOSE SERPL-MCNC: 98 MG/DL (ref 65–99)
GLUCOSE UR STRIP-MCNC: NEGATIVE MG/DL
HCT VFR BLD AUTO: 41.1 % (ref 34–46.6)
HGB BLD-MCNC: 13.5 G/DL (ref 12–15.9)
HGB UR QL STRIP.AUTO: NEGATIVE
HOLD SPECIMEN: NORMAL
HOLD SPECIMEN: NORMAL
IMM GRANULOCYTES # BLD AUTO: 0.02 10*3/MM3 (ref 0–0.05)
IMM GRANULOCYTES NFR BLD AUTO: 0.2 % (ref 0–0.5)
KETONES UR QL STRIP: NEGATIVE
LEUKOCYTE ESTERASE UR QL STRIP.AUTO: NEGATIVE
LIPASE SERPL-CCNC: 37 U/L (ref 13–60)
LYMPHOCYTES # BLD AUTO: 1.63 10*3/MM3 (ref 0.7–3.1)
LYMPHOCYTES NFR BLD AUTO: 14.4 % (ref 19.6–45.3)
MCH RBC QN AUTO: 30.1 PG (ref 26.6–33)
MCHC RBC AUTO-ENTMCNC: 32.8 G/DL (ref 31.5–35.7)
MCV RBC AUTO: 91.5 FL (ref 79–97)
MONOCYTES # BLD AUTO: 1.12 10*3/MM3 (ref 0.1–0.9)
MONOCYTES NFR BLD AUTO: 9.9 % (ref 5–12)
NEUTROPHILS NFR BLD AUTO: 73.4 % (ref 42.7–76)
NEUTROPHILS NFR BLD AUTO: 8.31 10*3/MM3 (ref 1.7–7)
NITRITE UR QL STRIP: NEGATIVE
NRBC BLD AUTO-RTO: 0 /100 WBC (ref 0–0.2)
PH UR STRIP.AUTO: 7 [PH] (ref 5–8)
PLATELET # BLD AUTO: 399 10*3/MM3 (ref 140–450)
PMV BLD AUTO: 10 FL (ref 6–12)
POTASSIUM SERPL-SCNC: 4.1 MMOL/L (ref 3.5–5.2)
PROT SERPL-MCNC: 6.9 G/DL (ref 6–8.5)
PROT UR QL STRIP: NEGATIVE
RBC # BLD AUTO: 4.49 10*6/MM3 (ref 3.77–5.28)
SODIUM SERPL-SCNC: 137 MMOL/L (ref 136–145)
SP GR UR STRIP: <=1.005 (ref 1–1.03)
UROBILINOGEN UR QL STRIP: NORMAL
WBC NRBC COR # BLD AUTO: 11.32 10*3/MM3 (ref 3.4–10.8)
WHOLE BLOOD HOLD COAG: NORMAL
WHOLE BLOOD HOLD SPECIMEN: NORMAL

## 2025-03-16 PROCEDURE — 74176 CT ABD & PELVIS W/O CONTRAST: CPT

## 2025-03-16 PROCEDURE — 25010000002 KETOROLAC TROMETHAMINE PER 15 MG

## 2025-03-16 PROCEDURE — 96374 THER/PROPH/DIAG INJ IV PUSH: CPT

## 2025-03-16 PROCEDURE — 81003 URINALYSIS AUTO W/O SCOPE: CPT

## 2025-03-16 PROCEDURE — 99284 EMERGENCY DEPT VISIT MOD MDM: CPT

## 2025-03-16 PROCEDURE — 80053 COMPREHEN METABOLIC PANEL: CPT

## 2025-03-16 PROCEDURE — 25010000002 ONDANSETRON PER 1 MG

## 2025-03-16 PROCEDURE — 85025 COMPLETE CBC W/AUTO DIFF WBC: CPT

## 2025-03-16 PROCEDURE — 81025 URINE PREGNANCY TEST: CPT

## 2025-03-16 PROCEDURE — 96375 TX/PRO/DX INJ NEW DRUG ADDON: CPT

## 2025-03-16 PROCEDURE — 93976 VASCULAR STUDY: CPT

## 2025-03-16 PROCEDURE — 83690 ASSAY OF LIPASE: CPT

## 2025-03-16 PROCEDURE — 76830 TRANSVAGINAL US NON-OB: CPT

## 2025-03-16 PROCEDURE — 76856 US EXAM PELVIC COMPLETE: CPT

## 2025-03-16 RX ORDER — ONDANSETRON 4 MG/1
4 TABLET, ORALLY DISINTEGRATING ORAL 4 TIMES DAILY PRN
Qty: 20 TABLET | Refills: 0 | Status: SHIPPED | OUTPATIENT
Start: 2025-03-16

## 2025-03-16 RX ORDER — ONDANSETRON 2 MG/ML
4 INJECTION INTRAMUSCULAR; INTRAVENOUS ONCE
Status: COMPLETED | OUTPATIENT
Start: 2025-03-16 | End: 2025-03-16

## 2025-03-16 RX ORDER — KETOROLAC TROMETHAMINE 30 MG/ML
15 INJECTION, SOLUTION INTRAMUSCULAR; INTRAVENOUS ONCE
Status: COMPLETED | OUTPATIENT
Start: 2025-03-16 | End: 2025-03-16

## 2025-03-16 RX ORDER — SODIUM CHLORIDE 0.9 % (FLUSH) 0.9 %
10 SYRINGE (ML) INJECTION AS NEEDED
Status: DISCONTINUED | OUTPATIENT
Start: 2025-03-16 | End: 2025-03-16 | Stop reason: HOSPADM

## 2025-03-16 RX ADMIN — KETOROLAC TROMETHAMINE 15 MG: 30 INJECTION, SOLUTION INTRAMUSCULAR at 12:32

## 2025-03-16 RX ADMIN — ONDANSETRON 4 MG: 2 INJECTION, SOLUTION INTRAMUSCULAR; INTRAVENOUS at 12:32

## 2025-03-16 NOTE — DISCHARGE INSTRUCTIONS
Continue antibiotics as previously prescribed.  Take Zofran as prescribed as needed for nausea.  Increase fluid intake.  Use Tylenol or ibuprofen per package instruction as needed for pain.    Call and establish PCP and follow-up with them.    Return to the ED for any new or worsening symptoms.

## 2025-03-16 NOTE — ED PROVIDER NOTES
Subjective   History of Present Illness  Patient is a 24-year-old female presenting to the ED for left lower quadrant abdominal pain, fever and vomiting since this morning.  Patient states she was seen at urgent care this past Friday diagnosed with a UTI, was sent in nitrofurantoin that she has been taking compliantly.  She states this morning she had a low-grade fever of 99.6 with 3-5 episodes of nonbloody emesis.  She also reports pain in her left lower quadrant described as a constant sharp pain that radiated into her left flank.  She reports dysuria, slight hematuria, and intermittent dizziness.  She denies any diarrhea, constipation, history of kidney stones, or history of abdominal surgeries.  Patient's last menstrual period was the last week of February.        Review of Systems   Constitutional:  Positive for fever. Negative for chills.   Gastrointestinal:  Positive for abdominal pain, nausea and vomiting. Negative for constipation and diarrhea.   Genitourinary:  Positive for dysuria, flank pain and hematuria.   Neurological:  Positive for dizziness.       Past Medical History:   Diagnosis Date    Asthma     Lung disease     Vocal cord dysfunction        No Known Allergies    Past Surgical History:   Procedure Laterality Date    EAR TUBES         No family history on file.    Social History     Socioeconomic History    Marital status: Single   Tobacco Use    Smoking status: Never     Passive exposure: Never    Smokeless tobacco: Never   Vaping Use    Vaping status: Never Used   Substance and Sexual Activity    Alcohol use: Yes     Comment: occ    Drug use: Never    Sexual activity: Defer           Objective   Physical Exam  Constitutional:       Appearance: Normal appearance.   HENT:      Head: Normocephalic and atraumatic.      Mouth/Throat:      Mouth: Mucous membranes are moist.   Eyes:      Extraocular Movements: Extraocular movements intact.   Cardiovascular:      Rate and Rhythm: Normal rate and  "regular rhythm.      Pulses: Normal pulses.      Heart sounds: Normal heart sounds.   Pulmonary:      Effort: Pulmonary effort is normal.      Breath sounds: Normal breath sounds.   Abdominal:      General: Abdomen is flat.      Palpations: Abdomen is soft.      Tenderness: There is abdominal tenderness in the suprapubic area and left lower quadrant. There is left CVA tenderness.      Comments: Patient significantly tender to palpation.   Musculoskeletal:         General: Normal range of motion.      Cervical back: Normal range of motion.      Right lower leg: No edema.      Left lower leg: No edema.   Skin:     General: Skin is warm and dry.      Capillary Refill: Capillary refill takes less than 2 seconds.   Neurological:      General: No focal deficit present.      Mental Status: She is alert and oriented to person, place, and time.   Psychiatric:         Mood and Affect: Mood normal.         Behavior: Behavior normal.         Procedures           ED Course  ED Course as of 03/17/25 1042   Sun Mar 16, 2025   1407 Waiting for ultrasound to be read. [EC]      ED Course User Index  [EC] Padmini Nance PA-C        /70   Pulse 91   Temp 99 °F (37.2 °C)   Resp 20   Ht 149.9 cm (59\")   Wt 70.9 kg (156 lb 4.9 oz)   LMP 02/26/2025 (Approximate)   SpO2 98%   BMI 31.57 kg/m²   Labs Reviewed   CBC WITH AUTO DIFFERENTIAL - Abnormal; Notable for the following components:       Result Value    WBC 11.32 (*)     Lymphocyte % 14.4 (*)     Neutrophils, Absolute 8.31 (*)     Monocytes, Absolute 1.12 (*)     All other components within normal limits   LIPASE - Normal   URINALYSIS W/ CULTURE IF INDICATED - Normal    Narrative:     In absence of clinical symptoms, the presence of pyuria, bacteria, and/or nitrites on the urinalysis result does not correlate with infection.  Urine microscopic not indicated.   PREGNANCY, URINE - Normal   RAINBOW DRAW    Narrative:     The following orders were created for panel order " [place] Draw.  Procedure                               Abnormality         Status                     ---------                               -----------         ------                     Green Top (Gel)[[id number]]                                  Final result               Lavender Top[[id number]]                                     Final result               Gold Top - SST[[id number]]                                   Final result               Light Blue Top[[id number]]                                   Final result                 Please view results for these tests on the individual orders.   COMPREHENSIVE METABOLIC PANEL    Narrative:     GFR Categories in Chronic Kidney Disease (CKD)      GFR Category          GFR (mL/min/1.73)    Interpretation  G1                     90 or greater         Normal or high (1)  G2                      60-89                Mild decrease (1)  G3a                   45-59                Mild to moderate decrease  G3b                   30-44                Moderate to severe decrease  G4                    15-29                Severe decrease  G5                    14 or less           Kidney failure          (1)In the absence of evidence of kidney disease, neither GFR category G1 or G2 fulfill the criteria for CKD.    eGFR calculation 2021 CKD-EPI creatinine equation, which does not include race as a factor   GREEN TOP   LAVENDER TOP   GOLD TOP - SST   LIGHT BLUE TOP   CBC AND DIFFERENTIAL    Narrative:     The following orders were created for panel order CBC & Differential.  Procedure                               Abnormality         Status                     ---------                               -----------         ------                     CBC Auto Differential[[id number]]        Abnormal            Final result                 Please view results for these tests on the individual orders.     Medications   ondansetron (ZOFRAN) injection 4 mg (4 mg Intravenous Given 3/16/25  1232)   ketorolac (TORADOL) injection 15 mg (15 mg Intravenous Given 3/16/25 1232)     US Pelvis Transvaginal Non OB  Result Date: 3/16/2025  Impression: No acute sonographic abnormality of the pelvis. Electronically Signed: Jovi Cerrato MD  3/16/2025 2:59 PM EDT  Workstation ID: [id number]    CT Abdomen Pelvis Stone Protocol  Result Date: 3/16/2025  1. Given motion limitation of the exam no definite acute intra-abdominal intrapelvic abnormality is noted. 2. Punctate calcification within the left side of the pelvis is favored to be vascular in nature although correlation with urinalysis is recommended if there is clinical concern. 3. Incidental note of some opacity within the left lower lobe which could represent atelectasis or pneumonia in the correct clinical setting. Please correlate clinically Electronically Signed: Brandon Davis MD  3/16/2025 12:13 PM EDT  Workstation ID: [id number]                                                   Medical Decision Making  Chart review: 3/14/25 Dr. [place] [facility] The presence of white blood cells, nitrites, and red blood cells in the urine indicates an infection. A urine culture will be conducted to identify the specific bacteria causing the infection and to determine the effectiveness of the prescribed antibiotic, Macrobid 100 mg, to be taken twice daily. She is advised to maintain high fluid intake and may use over-the-counter analgesics such as Tylenol or ibuprofen for fever or chills. A work note has been provided, excusing her from work until Monday. The nurse will provide additional paperwork. She will be contacted within a few days regarding the necessity of completing the current antibiotic course or if a change in medication is required. If the condition worsens, characterized by back pain, fever, or chills, she should seek immediate medical attention either at this facility or the emergency room.    Patient presented to the ED for the above complaint.    Patient underwent  the above exam and evaluation.    While in the ED patient was placed in gown and IV was established.  CT abdomen and pelvis and blood work was obtained to assess for diverticulitis, obstruction, perforation, electrolyte abnormality, dehydration, infection.  Ultrasound was obtained to assess for ovarian torsion.  Patient was given IV Toradol and Zofran.  Upon reevaluation patient is resting comfortably, states pain is greatly improved, patient no longer significantly tender to palpation.  Discussed findings with patient and mother at bedside.  Educated patient to continue antibiotics previously prescribed until complete, take Zofran as prescribed as needed for nausea, increase fluid intake, follow-up closely with PCP, and strict return precautions were discussed.  Patient voiced understanding, agreeable dispo plan.    Labs were independently interpreted by myself and deemed remarkable for the following: WBC 11.32, hemoglobin 13.5, glucose 98, lipase 37, urine pregnancy negative, urinalysis negative for hematuria and bacteria.  CT abdomen pelvis, pelvic ultrasound independently interpreted by  And reviewed by myself showing: CT abdomen pelvis showed punctate calcification within left side of pelvis, could be vascular in nature, incidental finding of opacity within left lower lobe, no acute findings.  Patient reporting temperature of 99.1 at home, no cough or congestion, no dyspnea.  Pelvic ultrasound showed no acute findings.    Appropriate PPE was worn during each patient encounter.    This document is intended for medical expert use only. Reading of this document by patients and/or patient's family without participating medical staff guidance may result in misinterpretation and unintended morbidity. Any interpretation of such data is the responsibility of the patient and/or family member responsible for the patient in concert with their primary or specialist providers, not to be left for sources of online  searches such as [employer], GI-View or similar queries. Relying on these approaches to knowledge may result in misinterpretation, misguided goals of care and even death should patients or family members try recommendations outside of the realm of professional medical care in a supervised inpatient environment.    Discussed this patient with Dr. Cheek who agrees with plan.    Problems Addressed:  Dysuria: complicated acute illness or injury  Left lower quadrant abdominal pain: complicated acute illness or injury  Nausea and vomiting, unspecified vomiting type: complicated acute illness or injury    Amount and/or Complexity of Data Reviewed  Labs: ordered.  Radiology: ordered.    Risk  Prescription drug management.        Final diagnoses:   Left lower quadrant abdominal pain   Nausea and vomiting, unspecified vomiting type   Dysuria       ED Disposition  ED Disposition       ED Disposition   Discharge    Condition   Stable    Comment   --               PATIENT CONNECTION - [facility]  [facility] [address]  [phone]  Schedule an appointment as soon as possible for a visit            Medication List        New Prescriptions      ondansetron ODT 4 MG disintegrating tablet  Commonly known as: ZOFRAN-ODT  Take 1 tablet by mouth 4 (Four) Times a Day As Needed for Nausea or Vomiting.               Where to Get Your Medications        These medications were sent to [facility] [id number] - [place], [place] - [address] - [phone]  - [phone] FX  [address] [facility] [place] [address]      Phone: [phone]   ondansetron ODT 4 MG disintegrating tablet            Padmini Nance PA-C  03/17/25 1047